# Patient Record
Sex: FEMALE | Race: OTHER
[De-identification: names, ages, dates, MRNs, and addresses within clinical notes are randomized per-mention and may not be internally consistent; named-entity substitution may affect disease eponyms.]

---

## 2021-08-26 ENCOUNTER — HOSPITAL ENCOUNTER (OUTPATIENT)
Dept: HOSPITAL 93 - PPH VACUNA | Age: 76
Discharge: HOME | End: 2021-08-26
Payer: COMMERCIAL

## 2021-08-26 DIAGNOSIS — Z23: Primary | ICD-10-CM

## 2022-03-28 ENCOUNTER — HOSPITAL ENCOUNTER (INPATIENT)
Dept: HOSPITAL 93 - ER | Age: 77
LOS: 4 days | Discharge: HOME | DRG: 598 | End: 2022-04-01
Attending: INTERNAL MEDICINE | Admitting: INTERNAL MEDICINE
Payer: COMMERCIAL

## 2022-03-28 VITALS — WEIGHT: 121 LBS | BODY MASS INDEX: 22.26 KG/M2 | HEIGHT: 62 IN

## 2022-03-28 DIAGNOSIS — C50.911: ICD-10-CM

## 2022-03-28 DIAGNOSIS — J91.0: ICD-10-CM

## 2022-03-28 DIAGNOSIS — C79.2: Primary | ICD-10-CM

## 2022-03-28 DIAGNOSIS — Z20.822: ICD-10-CM

## 2022-03-28 DIAGNOSIS — C79.89: ICD-10-CM

## 2022-03-28 DIAGNOSIS — L98.499: ICD-10-CM

## 2022-03-28 DIAGNOSIS — C50.912: ICD-10-CM

## 2022-03-28 DIAGNOSIS — J94.2: ICD-10-CM

## 2022-03-28 PROCEDURE — C22G1ZZ TOMOGRAPHIC (TOMO) NUCLEAR MEDICINE IMAGING OF MYOCARDIUM USING TECHNETIUM 99M (TC-99M): ICD-10-PCS | Performed by: STUDENT IN AN ORGANIZED HEALTH CARE EDUCATION/TRAINING PROGRAM

## 2022-03-28 PROCEDURE — 4A02XM4 MEASUREMENT OF CARDIAC TOTAL ACTIVITY, EXTERNAL APPROACH: ICD-10-PCS | Performed by: STUDENT IN AN ORGANIZED HEALTH CARE EDUCATION/TRAINING PROGRAM

## 2022-03-28 PROCEDURE — B246ZZZ ULTRASONOGRAPHY OF RIGHT AND LEFT HEART: ICD-10-PCS | Performed by: INTERNAL MEDICINE

## 2022-03-28 PROCEDURE — 3E073KZ INTRODUCTION OF OTHER DIAGNOSTIC SUBSTANCE INTO CORONARY ARTERY, PERCUTANEOUS APPROACH: ICD-10-PCS | Performed by: STUDENT IN AN ORGANIZED HEALTH CARE EDUCATION/TRAINING PROGRAM

## 2022-03-28 NOTE — NUR
SE RECIBE PTE ALERTA Y ORIENTADA X3. REFIERE MAREOS Y DEBILIDAD DESDE ESTA
MANANA. INDICA ES PTE DE CA DEL SENO, SE ATIENDE CON DR. RONNIE REILLY Y CON
DR. CLAROS. INDICA PADECE DE DIVERTICULITIS Y ESPINOZA ESTADO EN DIETA ESTRICTA DESDE
HACE UNAS SEMANAS. SE REALIZA ECG, SE MUESTRA A DR. GENAO QUIEN INDICA
COLOCAR A PTE EN CRITICO. SE UBICA PTE.

## 2022-03-29 PROCEDURE — 4A12X4Z MONITORING OF CARDIAC ELECTRICAL ACTIVITY, EXTERNAL APPROACH: ICD-10-PCS | Performed by: INTERNAL MEDICINE

## 2022-03-30 PROCEDURE — 0W9930Z DRAINAGE OF RIGHT PLEURAL CAVITY WITH DRAINAGE DEVICE, PERCUTANEOUS APPROACH: ICD-10-PCS | Performed by: RADIOLOGY

## 2022-03-30 PROCEDURE — BW24YZZ COMPUTERIZED TOMOGRAPHY (CT SCAN) OF CHEST AND ABDOMEN USING OTHER CONTRAST: ICD-10-PCS | Performed by: INTERNAL MEDICINE

## 2022-05-03 ENCOUNTER — HOSPITAL ENCOUNTER (OUTPATIENT)
Dept: HOSPITAL 93 - RAD | Age: 77
Discharge: HOME | End: 2022-05-03
Attending: INTERNAL MEDICINE
Payer: COMMERCIAL

## 2022-05-03 DIAGNOSIS — J90: Primary | ICD-10-CM

## 2022-05-03 DIAGNOSIS — J93.9: ICD-10-CM

## 2022-07-08 ENCOUNTER — HOSPITAL ENCOUNTER (OUTPATIENT)
Dept: HOSPITAL 93 - PPH VACUNA | Age: 77
Discharge: HOME | End: 2022-07-08
Attending: PEDIATRICS
Payer: COMMERCIAL

## 2022-07-08 DIAGNOSIS — Z23: Primary | ICD-10-CM

## 2022-10-18 ENCOUNTER — HOSPITAL ENCOUNTER (INPATIENT)
Dept: HOSPITAL 93 - ER | Age: 77
LOS: 7 days | Discharge: HOME | DRG: 644 | End: 2022-10-25
Attending: INTERNAL MEDICINE | Admitting: INTERNAL MEDICINE
Payer: COMMERCIAL

## 2022-10-18 VITALS — BODY MASS INDEX: 17.48 KG/M2 | WEIGHT: 95 LBS | HEIGHT: 62 IN

## 2022-10-18 DIAGNOSIS — C50.911: ICD-10-CM

## 2022-10-18 DIAGNOSIS — C79.2: ICD-10-CM

## 2022-10-18 DIAGNOSIS — E31.8: ICD-10-CM

## 2022-10-18 DIAGNOSIS — Z20.822: ICD-10-CM

## 2022-10-18 DIAGNOSIS — E27.49: ICD-10-CM

## 2022-10-18 DIAGNOSIS — E23.0: ICD-10-CM

## 2022-10-18 DIAGNOSIS — J91.0: ICD-10-CM

## 2022-10-18 DIAGNOSIS — R09.02: ICD-10-CM

## 2022-10-18 DIAGNOSIS — E22.2: Primary | ICD-10-CM

## 2022-10-18 NOTE — NUR
SE LE ORIENTA A PACIENTE SOBRE LAS ORDENES MEDICAS, REFIERE ENTEDER LAS MISMAS.
SE LE EMANUEL LAS MUETRAS Y SE LE REALIZA PLACA INGRIS LA ORDEN MEDICA.